# Patient Record
Sex: FEMALE | Race: WHITE
[De-identification: names, ages, dates, MRNs, and addresses within clinical notes are randomized per-mention and may not be internally consistent; named-entity substitution may affect disease eponyms.]

---

## 2017-01-18 ENCOUNTER — HOSPITAL ENCOUNTER (OUTPATIENT)
Dept: HOSPITAL 45 - C.PATHSPEC | Age: 52
Discharge: HOME | End: 2017-01-18
Attending: OBSTETRICS & GYNECOLOGY
Payer: COMMERCIAL

## 2017-01-18 DIAGNOSIS — R87.69: ICD-10-CM

## 2017-01-18 DIAGNOSIS — N95.0: Primary | ICD-10-CM

## 2017-02-06 ENCOUNTER — HOSPITAL ENCOUNTER (OUTPATIENT)
Dept: HOSPITAL 45 - C.LAB1850 | Age: 52
Discharge: HOME | End: 2017-02-06
Attending: INTERNAL MEDICINE
Payer: COMMERCIAL

## 2017-02-06 ENCOUNTER — HOSPITAL ENCOUNTER (OUTPATIENT)
Dept: HOSPITAL 45 - C.RAD1850 | Age: 52
Discharge: HOME | End: 2017-02-06
Attending: INTERNAL MEDICINE
Payer: COMMERCIAL

## 2017-02-06 DIAGNOSIS — K59.00: Primary | ICD-10-CM

## 2017-02-06 LAB
ALBUMIN/GLOB SERPL: 0.8 {RATIO} (ref 0.9–2)
ALP SERPL-CCNC: 166 U/L (ref 45–117)
ALT SERPL-CCNC: 40 U/L (ref 12–78)
ANION GAP SERPL CALC-SCNC: 11 MMOL/L (ref 3–11)
AST SERPL-CCNC: 22 U/L (ref 15–37)
BASOPHILS # BLD: 0.02 K/UL (ref 0–0.2)
BASOPHILS NFR BLD: 0.1 %
BUN SERPL-MCNC: 28 MG/DL (ref 7–18)
BUN/CREAT SERPL: 30.7 (ref 10–20)
CALCIUM SERPL-MCNC: 9.6 MG/DL (ref 8.5–10.1)
CHLORIDE SERPL-SCNC: 104 MMOL/L (ref 98–107)
CO2 SERPL-SCNC: 25 MMOL/L (ref 21–32)
COMPLETE: YES
CREAT SERPL-MCNC: 0.9 MG/DL (ref 0.6–1.2)
EOSINOPHIL NFR BLD AUTO: 314 K/UL (ref 130–400)
GLOBULIN SER-MCNC: 4.4 GM/DL (ref 2.5–4)
GLUCOSE SERPL-MCNC: 103 MG/DL (ref 70–99)
HCT VFR BLD CALC: 39.3 % (ref 37–47)
IG%: 0.6 %
IMM GRANULOCYTES NFR BLD AUTO: 11.8 %
LYMPHOCYTES # BLD: 3.17 K/UL (ref 1.2–3.4)
MCH RBC QN AUTO: 34.3 PG (ref 25–34)
MCHC RBC AUTO-ENTMCNC: 35.4 G/DL (ref 32–36)
MCV RBC AUTO: 97 FL (ref 80–100)
MONOCYTES NFR BLD: 6.2 %
NEUTROPHILS # BLD AUTO: 0 %
NEUTROPHILS NFR BLD AUTO: 81.3 %
PMV BLD AUTO: 9.6 FL (ref 7.4–10.4)
POTASSIUM SERPL-SCNC: 3.5 MMOL/L (ref 3.5–5.1)
RBC # BLD AUTO: 4.05 M/UL (ref 4.2–5.4)
ROULEAUX BLD QL SMEAR: (no result)
SODIUM SERPL-SCNC: 140 MMOL/L (ref 136–145)
TSH SERPL-ACNC: 0.88 UIU/ML (ref 0.3–4.5)
WBC # BLD AUTO: 26.96 K/UL (ref 4.8–10.8)

## 2017-02-06 NOTE — DIAGNOSTIC IMAGING REPORT
ABDOMEN 2VIEW W/PA CHEST RTN



CLINICAL HISTORY: K59.00 Constipation    



COMPARISON STUDY:  No previous studies for comparison. 



FINDINGS: The erect chest reveals no free air. There is a right scapular

deformity which appears chronic. There are no abnormally dilated loops of large

or small bowel. There are no transition zones indicate bowel obstruction. There

is mild fecal retention. There is equivocal faint right renal calculus.



IMPRESSION:  

1. No evidence of bowel obstruction. No evidence of free air

2. Fecal retention

3. Equivocal 3 mm right renal calculus 







Electronically signed by:  Jonnie Brown M.D.

2/6/2017 4:18 PM



Dictated Date/Time:  2/6/2017 4:17 PM

## 2017-02-23 LAB
ANION GAP SERPL CALC-SCNC: 10 MMOL/L (ref 3–11)
BASOPHILS # BLD: 0.02 K/UL (ref 0–0.2)
BASOPHILS NFR BLD: 0.2 %
BUN SERPL-MCNC: 26 MG/DL (ref 7–18)
BUN/CREAT SERPL: 29.5 (ref 10–20)
CALCIUM SERPL-MCNC: 9 MG/DL (ref 8.5–10.1)
CHLORIDE SERPL-SCNC: 104 MMOL/L (ref 98–107)
CO2 SERPL-SCNC: 24 MMOL/L (ref 21–32)
COMPLETE: YES
CREAT CL PREDICTED SERPL C-G-VRATE: 57.8 ML/MIN
CREAT SERPL-MCNC: 0.87 MG/DL (ref 0.6–1.2)
EOSINOPHIL NFR BLD AUTO: 270 K/UL (ref 130–400)
GLUCOSE SERPL-MCNC: 78 MG/DL (ref 70–99)
HCT VFR BLD CALC: 36.2 % (ref 37–47)
IG%: 0.6 %
IMM GRANULOCYTES NFR BLD AUTO: 47 %
LYMPHOCYTES # BLD: 4.11 K/UL (ref 1.2–3.4)
MCH RBC QN AUTO: 33 PG (ref 25–34)
MCHC RBC AUTO-ENTMCNC: 34.8 G/DL (ref 32–36)
MCV RBC AUTO: 94.8 FL (ref 80–100)
MONOCYTES NFR BLD: 8.9 %
NEUTROPHILS # BLD AUTO: 1.6 %
NEUTROPHILS NFR BLD AUTO: 41.7 %
PMV BLD AUTO: 9.1 FL (ref 7.4–10.4)
POTASSIUM SERPL-SCNC: 3.9 MMOL/L (ref 3.5–5.1)
RBC # BLD AUTO: 3.82 M/UL (ref 4.2–5.4)
SODIUM SERPL-SCNC: 138 MMOL/L (ref 136–145)
TOXIC GRANULES BLD QL SMEAR: (no result)
WBC # BLD AUTO: 8.75 K/UL (ref 4.8–10.8)

## 2017-02-23 NOTE — PAT MEDICATION INSTRUCTIONS
Service Date


Feb 23, 2017.





Current Home Medication List


Acetaminophen (Tylenol), 2 TAB PO TID PRN for Pain


Albuterol Sulfate (Proair Respiclick), 2 PUFF INH TID


Azelastine Hcl (Astelin Nasal [place]), 1-2 SPRAYS KASI BID


Budesonide/Formoterol Fumarate (Symbicort 160/4.5 Inhaler ), 2 PUFFS INH BID


Buspirone Hcl (Buspirone Hcl), 30 MG PO BID


Cyclobenzaprine Hcl (Flexeril), 1 TAB PO TID PRN for Pain


Epinephrine (Epipen), 0.3 MG IM UD


Escitalopram Oxalate (Lexapro), 20 MG PO QAM


Fluticasone Propionate (Nasal) (Flonase Allergy Relief), 2 SPRAYS KASI BID


Gabapentin (Neurontin), 900 MG PO TID


Multiple Vitamin (Multivitamin), 1 TAB PO QAM


Omega-3 Fatty Acids (Fish Oil), 1 CAP PO MWF


Omeprazole (Prilosec), 40 MG PO QAM


Sulindac (Sulindac), BID


Sumatriptan Succinate (Imitrex), 1 TAB PO UD PRN for Migraine


Tiotropium Bromide (Spiriva Handihaler), 1 CAP INH QAM


Trazodone Hcl (Trazodone), 50 MG PO HS PRN for Sleep


Valacyclovir Hcl (Valtrex), 1 GM PO BID PRN for COLD SORES





Medication Instructions


For Your Scheduled Surgery 





- Continue as directed:


Epinephrine (Epipen), 0.3 MG IM UD











- Hold the following medications 2 weeks prior to surgery:


Omega-3 Fatty Acids (Fish Oil), 1 CAP PO MWF











- Hold the following medications the morning of surgery:


Multiple Vitamin (Multivitamin), 1 TAB PO QAM


Sulindac (Sulindac), BID


Cyclobenzaprine Hcl (Flexeril), 1 TAB PO TID PRN for Pain











- Take the following medications the morning of surgery with a sip of water:


Valacyclovir Hcl (Valtrex), 1 GM PO BID PRN for COLD SORES


Acetaminophen (Tylenol), 2 TAB PO TID PRN for Pain (may take if needed up to 4 

hours prior to surgery)


Albuterol Sulfate (Proair Respiclick), 2 PUFF INH TID (use if needed; BRING TO 

HOSPITAL)


Azelastine Hcl (Astelin Nasal [place]), 1-2 SPRAYS KASI BID


Budesonide/Formoterol Fumarate (Symbicort 160/4.5 Inhaler ), 2 PUFFS INH BID


Escitalopram Oxalate (Lexapro), 20 MG PO QAM


Omeprazole (Prilosec), 40 MG PO QAM


Fluticasone Propionate (Nasal) (Flonase Allergy Relief), 2 SPRAYS KASI BID


Gabapentin (Neurontin), 900 MG PO TID


Buspirone Hcl (Buspirone Hcl), 30 MG PO BID


Sumatriptan Succinate (Imitrex), 1 TAB PO UD PRN for Migraine


Tiotropium Bromide (Spiriva Handihaler), 1 CAP INH QAM











- Take the following medications as scheduled the night before surgery:


Valacyclovir Hcl (Valtrex), 1 GM PO BID PRN for COLD SORES


Acetaminophen (Tylenol), 2 TAB PO TID PRN for Pain


Albuterol Sulfate (Proair Respiclick), 2 PUFF INH TID


Azelastine Hcl (Astelin Nasal [place]), 1-2 SPRAYS KASI BID


Budesonide/Formoterol Fumarate (Symbicort 160/4.5 Inhaler ), 2 PUFFS INH BID


Fluticasone Propionate (Nasal) (Flonase Allergy Relief), 2 SPRAYS KASI BID


Gabapentin (Neurontin), 900 MG PO TID


Buspirone Hcl (Buspirone Hcl), 30 MG PO BID


Sumatriptan Succinate (Imitrex), 1 TAB PO UD PRN for Migraine


Sulindac (Sulindac), BID


Trazodone Hcl (Trazodone), 50 MG PO HS PRN for Sleep


Cyclobenzaprine Hcl (Flexeril), 1 TAB PO TID PRN for Pain











If you have any questions please call us at [phone] or [phone] or 

[phone]

## 2017-02-23 NOTE — DIAGNOSTIC IMAGING REPORT
CHEST 2 VIEWS ROUTINE



CLINICAL HISTORY: pat preoperative evaluation



COMPARISON STUDY:  2/6/2017



FINDINGS: The bones soft tissues and hemidiaphragms are normal. The

cardiomediastinal silhouette is normal. The lungs are clear. The pulmonary

vasculature is normal. 



IMPRESSION:  Negative chest. 









Electronically signed by:  Lino Sung M.D.

2/23/2017 3:57 PM



Dictated Date/Time:  2/23/2017 3:56 PM

## 2017-03-13 ENCOUNTER — HOSPITAL ENCOUNTER (OUTPATIENT)
Dept: HOSPITAL 45 - C.ACU | Age: 52
Setting detail: OBSERVATION
Discharge: HOME | End: 2017-03-13
Attending: OBSTETRICS & GYNECOLOGY | Admitting: OBSTETRICS & GYNECOLOGY
Payer: COMMERCIAL

## 2017-03-13 VITALS
TEMPERATURE: 97.88 F | SYSTOLIC BLOOD PRESSURE: 120 MMHG | DIASTOLIC BLOOD PRESSURE: 77 MMHG | HEART RATE: 93 BPM | OXYGEN SATURATION: 93 %

## 2017-03-13 VITALS
DIASTOLIC BLOOD PRESSURE: 77 MMHG | TEMPERATURE: 97.7 F | SYSTOLIC BLOOD PRESSURE: 127 MMHG | OXYGEN SATURATION: 98 % | HEART RATE: 85 BPM

## 2017-03-13 VITALS
DIASTOLIC BLOOD PRESSURE: 75 MMHG | TEMPERATURE: 97.7 F | OXYGEN SATURATION: 98 % | SYSTOLIC BLOOD PRESSURE: 120 MMHG | HEART RATE: 90 BPM

## 2017-03-13 VITALS
OXYGEN SATURATION: 96 % | HEART RATE: 83 BPM | TEMPERATURE: 97.88 F | SYSTOLIC BLOOD PRESSURE: 132 MMHG | DIASTOLIC BLOOD PRESSURE: 77 MMHG

## 2017-03-13 VITALS
TEMPERATURE: 97.7 F | HEART RATE: 85 BPM | SYSTOLIC BLOOD PRESSURE: 127 MMHG | DIASTOLIC BLOOD PRESSURE: 77 MMHG | OXYGEN SATURATION: 98 %

## 2017-03-13 VITALS
BODY MASS INDEX: 22.31 KG/M2 | BODY MASS INDEX: 22.31 KG/M2 | BODY MASS INDEX: 22.31 KG/M2 | WEIGHT: 118.17 LBS | HEIGHT: 61 IN | HEIGHT: 61 IN | WEIGHT: 118.17 LBS

## 2017-03-13 VITALS — OXYGEN SATURATION: 93 %

## 2017-03-13 DIAGNOSIS — G89.4: ICD-10-CM

## 2017-03-13 DIAGNOSIS — J44.9: ICD-10-CM

## 2017-03-13 DIAGNOSIS — K21.9: ICD-10-CM

## 2017-03-13 DIAGNOSIS — N87.9: ICD-10-CM

## 2017-03-13 DIAGNOSIS — N95.0: Primary | ICD-10-CM

## 2017-03-13 DIAGNOSIS — R10.2: ICD-10-CM

## 2017-03-13 DIAGNOSIS — F17.200: ICD-10-CM

## 2017-03-13 DIAGNOSIS — K66.0: ICD-10-CM

## 2017-03-13 LAB
HCT VFR BLD CALC: 37.3 % (ref 37–47)
PREG INTERNAL NEGATIVE QC: (no result)
PREG INTERNAL POSITIVE QC: (no result)

## 2017-03-13 PROCEDURE — 58571 TLH W/T/O 250 G OR LESS: CPT

## 2017-03-13 NOTE — [facility] POST OPERATIVE BRIEF NOTE
Immediate Operative Summary


Operative Date


Mar 13, 2017.





Pre-Operative Diagnosis





Pelvic Pain, Postmenopausal Bleeding, Cervical Dysplasia





Post-Operative Diagnosis





Same as preop





Procedure(s) Performed





TLH+BSO, lysis of adhesions, cystoscopy, robotic assisted





Surgeon


Dr. Anderson





Assistant Surgeon(s)


Dr. Nicole





Estimated Blood Loss


20ml





Findings


anterior abdominal wall adhesions





Specimens





A. Cervix, Uterus, Bilateral Fallopian Tubes and Ovaries





Drains


Hensley





Anesthesia


General





Complication(s)


None





Disposition


Recovery Room / PACU

## 2017-03-13 NOTE — OPERATIVE REPORT
DATE OF OPERATION:  03/13/2017

 

PREOPERATIVE DIAGNOSES:  Pelvic pain, postmenopausal bleeding, cervical

dysplasia.

 

POSTOPERATIVE DIAGNOSES:  Same.

 

PROCEDURE:  TLH-BSO, lysis of adhesions, cystoscopy robotically assisted.

 

SURGEON:  Tre Anderson MD

 

ASSISTANT:  Tolu Nicole MD

 

ESTIMATED BLOOD LOSS:  20 mL.

 

FINDINGS:  Extensive intra-abdominal wall adhesions in the peritoneal cavity.

 

SPECIMENS:  Cervix, uterus, bilateral fallopian tubes and ovaries.

 

DRAINS:  Hensley catheter.

 

ANESTHETIC:  General.

 

COMPLICATIONS:  None.

 

DISPOSITION:  Recovery room.

 

CLINICAL NOTE:  [place], we reviewed before the surgery she did state she had

what sounds like a hysterotomy done at 16-20 weeks prior to surgery, I

included this in a bridge note.  We did discuss this increased chance of

internal organ damage as there could be extensive adhesions.

 

DESCRIPTION OF PROCEDURE:  The patient was given general anesthetic, prepped

and draped in dorsal lithotomy position.  IV Ancef given preop.  Hensley

catheter placed in the bladder and uterine VCare placed into her cervix and

uterus and sewn in place.

 

Gloves changed and a supraumbilical incision made with scalpel using open

Julissa technique, we dissected down through subcutaneous fat to the fascia,

splitting the rectus muscles and entering the peritoneal cavity. 

Blunt-tipped Julissa trocar then placed.  Balloon inflated and then CO2 gas to

insufflate the abdomen.

 

FINDINGS:  Upper abdomen normal, no sign of visceral organ injury.  After

deep Trendelenburg position, I viewed the pelvis.

 

At this stage, I could not see the uterus and anterior abdominal wall

adhesions of omentum were present, pictures taken for documentation.  These

included to the right pelvic sidewall as well in the left pelvic sidewall. 

Placed 3 robotic arms, 2 on the right and 1 in the left and in the left upper

quadrant 11 mm bladeless port was placed as well.  Docked the robot.  Arm #1

was monopolar cordell, arm #2 bipolar [place], arm #3 ProGrasp.

 

Carefully dissecting the adhesions away, these were mainly thin, I was able

to dissect away the omental adhesions from the anterior abdominal wall, these

have continued to the anterior aspect of the uterus were dissected away using

the monopolar cordell and the bipolar [place].  Same process on the left side

and the right side as well, taking care to avoid any damage to the

surrounding structures.  Once all the adhesions were away, I was then able to

identify her anatomy.  She had normal ureter locations in both left and right

side.  I made a window into the broad ligament and then dissected the ovarian

artery and vein and then coagulated this with a bipolar [place] taking care

to note that the ureter was well away from this location.  This was then cut.

 Monopolar cordell used to skeletonize and then round ligament was coagulated

and cut as well.

 

Anterior bladder flap had adhesions consistent with the prior hysterotomy. 

We resected these very carefully and sharply, identified the uterine artery

and vein, coagulated this after dissecting the bladder away fully and cut

with monopolar cordell.  Again, the ureter was well away.  Same process

continued on the right.  Once the bladder flap was fully dissected away and

bilaterally the uterine vessels were sealed and cut, I was able to make an

anterior colpotomy and continued this around to separate the cervix and the

vagina.  We stayed medial to our uterine vessel ligations.  The uterus was

then pulled into the vagina and then removed along with the adnexa.  At this

stage, we then placed a sponge in a glove to maintain pneumoperitoneum. 

Instrument exchanges then occurred.  Arm #1 became the timo needle ,

arm #2 became the cobra, 12-inch 2-0 90-day V-Loc suture was then passed

through the accessory port.  IV methylene blue was given by anesthesia, 10

mL.

 

Cup was closed from left to right, back right to left, incorporating at least

1 cm full thickness vaginal mucosa bites.  Suture was cut so there was no

tail.  Needle was removed through the accessory port.

 

I felt the right uterosacral ligament was not as well approximated to the

cuff, so I did actually pass a 2-0 Vicryl on an SH needle 6-inch long and

then a simple interrupted including the uterosacral into the cuff was done

for better pelvic support.  This was cut.  Needle was then removed through

the accessory port.  After this generous irrigation and suction, there was no

leakage of methylene blue dye and hemostasis was excellent.

 

Robot instruments removed, robot undocked, ports removed, gas allowed to

escape.  All incisions have been injected with 0.5% Marcaine before the case

and irrigated with sterile saline.  Fascia closed with 0 Vicryl in the

umbilical incision and left upper quadrant.  Subcutaneous fat closed with 0

Vicryl and incisions closed with 4-0 subcuticular Monocryl and Dermabond. 

Sponge and instrument counts correct.

 

Cystoscopy was performed by removing the Hensley catheter.  Note the sponge had

been removed from the vagina as well.  Cystoscope revealed a normal bladder,

and no sign of trauma or damage or sutures and good strong jets of blue dye

from both left and right ureter openings.  Cystoscope removed and a new Hensley

catheter placed.  There was minimal vaginal bleeding.  At the end of the

procedure, sponge and instrument counts correct.

 

 

I attest to the content of the Intraoperative Record and any orders documented therein. Any exceptio
ns are noted below.

## 2017-03-13 NOTE — ANESTHESIOLOGY PROGRESS NOTE
Anesthesia Post Op Note


Date & Time


Mar 13, 2017 at 17:00





Vital Signs


Pain Intensity:  3





 Vital Signs Past 12 Hours








  Date Time  Temp Pulse Resp B/P Pulse Ox O2 Delivery O2 Flow Rate FiO2


 


3/13/17 16:50  94 18 129/72 96 Nasal Cannula 2 


 


3/13/17 16:40  97 18 139/75 96 Nasal Cannula 2 


 


3/13/17 16:30  98 17 132/72 95 Nasal Cannula 2 


 


3/13/17 16:24 36.0 110 18 133/73 92 Nasal Cannula 2 


 


3/13/17 10:43 36.6 83 18 132/77 96 Room Air  











Notes


Mental Status:  alert / awake / arousable, participated in evaluation


Pt Amnestic to Procedure:  Yes


Nausea / Vomiting:  adequately controlled


Pain:  adequately controlled


Airway Patency, RR, SpO2:  stable & adequate


BP & HR:  stable & adequate


Hydration State:  stable & adequate


Anesthetic Complications:  no major complications apparent

## 2017-03-13 NOTE — HISTORY & PHYSICAL BRIDGE NOTE
H&P Re-Evaluation


Bridge Note:


I have examined the patient, reviewed the History & Physical and in the 

interval since the performance of the History & Physical I have noted the 

following changes of clinical significance: No changes noted





This is a second bridge note. Patient states that she had a laparotomy 

surrounding one of her prior pregnancies. A 16-20 week demise. She was unable 

to provide any solid details. I discussed that prior laparotomy might increase 

risk of adhesions and injury to internal organs.





AV

## 2017-03-13 NOTE — DISCHARGE INSTRUCTIONS
Discharge Instructions


Date of Service


Mar 13, 2017.





Admission


Reason for Admission:  Pelvic Pain, PMB





Discharge


Discharge Diagnosis / Problem:  menorrhagia, pelvic pain





Discharge Goals


Goal(s):  Routine recovery after surgery





Activity Recommendations


Activity Limitations:  per Instructions/Follow-up section





.





Instructions / Follow-Up


Instructions / Follow-Up





POST OPERATIVE:





BOWEL FUNCTION/MEDICATIONS:





1.  Constipation pain and discomfort are the most common complaints 5-7 days 

after 


     surgery.  Points 2-6 address the things that can help.


2.  Chewing gum can help stimulate the gut and help improve digestion and 

motility.


3.  Milk of Magnesia 1-2 times per day until return of bowel function.


4.  Colace is a stool softener that helps.  Taking this 2-3 times per day until 

bowel 


     function returns to normal is highly recommended.


5.  Dulcolax is a laxative that may be used if several days have passed without 

a 


     bowel movement.  Alternatively Miralax may be used daily instead.


6.  Drink plenty of fluids as this will also reduce constipation.


7.  Narcotic pain medications will be prescribed by your physician.  They are 

safe 


     to use and we encourage you to use them.  If you are not allergic, 

ibuprofen will


     also be prescribed.  Many patients will be able to transition off of the 

narcotic 


     medications to ibuprofen by postoperative day 3.





ACTIVITY RECOMMENDATIONS:





1.  Get plenty of rest and listen to your body.  If you are tired, take a nap.


2.  You may shower, but do not take a tub bath until you see your doctor at the 


     2 week post operative visit.


3.  Absolutely NO intercourse and nothing in the vagina until you are examined 


     by your doctor at the 6 week visit.  At that visit it will be determined 

when such


     activities can be resumed.  This can range from 6-12 weeks after your 

surgery 


     depending on healing time.


4.  The main physical activity in the first week should be walking.  By the 

second


     week you can slowly increase activity.  There are no limits on walking up 

and


     down stairs.


5.  Do not lift more than 5-10 lbs for 4 weeks.  Remember the "one-handed rule",


     i.e. if you can lift something with only one hand it's likely okay.


6.  Minimize household chores like vacuuming and exercising for 4 weeks.  


     "Overdoing it" can lead to incisions not healing, pain and vaginal bleeding

, 


     so again, listen to your body.


7.  Driving can be resumed when you feel able.  Do not drive within 24 hours of 

taking


     a narcotic medication.





EXPECTATIONS:





1. Vaginal spotting, bleeding and discharge are common after surgery.  There may


    even be an odor to the discharge which is often related to sutures used in 

the vagina.


    If you experience heavy vaginal bleeding, call the office number day or 


    night [phone].





2.  Bladder discomfort is common after surgery from the catheter.  This usually 

resolves


     in 1-2 weeks.


3.  By the end of the 3rd or 4th week you should be feeling much better.  It 

may take 


     up to 6 weeks for your energy levels to return to normal.


4.  Narcotic medications have side effects such as: dizziness, headache, nausea 

and/or


     vomiting.  If you suspect your pain medication is causing problems, call 

our office 


     and we may be able to prescribe an alternate medication.


5.  The skin incisions are often covered with a liquid bandage.  This will 

gradually peel off 


     over time.





CALL THE OFFICE IF YOU HAVE ANY OF THE FOLLOWIN.  Temperature of 101 degrees or higher.


2.  Severe abdominal or pelvic pain not relieved by pain medication.


3.  Persistent nausea or vomiting.


4.  Increased pain with urination or difficulty urinating.


5.  Bright red bleeding that soaks more than 1 pad per hour.








CONTACT PHONE NUMBERS:





Main Office:  [phone]


Surgical Nurse:  [phone] extension [phone]








Avoid all tobacco products.  If you need help to stop smoking, call


[place]'s FREE QUITLINE at [phone].  This is a free call.














Current Hospital Diet


Patient's current hospital diet:





Discharge Diet


Recommended Diet:  Regular Diet





Procedures


Procedures Performed:  


TLH+BSO, lysis of adhesions, cystoscopy, robotic assisted





Pending Studies


Studies pending at discharge:  no





Medical Emergencies








.


Who to Call and When:





Medical Emergencies:  If at any time you feel your situation is an emergency, 

please call 911 immediately.





.





Non-Emergent Contact


Non-Emergency issues call your:  Primary Care Provider





.


.





"Provider Documentation" section prepared by Tre Anderson.





VTE Core Measure


Inpt VTE Proph given/why not?:  T.E.D. Stockings, LC's

## 2017-03-21 NOTE — DISCHARGE SUMMARY
HOSPITAL COURSE:  [place] had surgery, total laparoscopic hysterectomy, on

03/13/2016.  She was discharged several hours after her surgery.

 

SUBJECTIVE:  At that time, she was ambulating, tolerating an oral diet, oral

pain medication, had minimal bleeding and no extremity pain.

 

PHYSICAL EXAMINATION:  Her vital signs are stable.  She is afebrile.

 

IMPRESSION AND PLAN:  Several hours post total laparoscopic hysterectomy

meets discharge criteria, was discharged home on appropriate pain medication

and told to follow up in the office and given discharge instructions.

## 2018-07-18 ENCOUNTER — HOSPITAL ENCOUNTER (OUTPATIENT)
Dept: HOSPITAL 45 - C.LABBFT | Age: 53
Discharge: HOME | End: 2018-07-18
Attending: NURSE PRACTITIONER
Payer: COMMERCIAL

## 2018-07-18 DIAGNOSIS — Z13.228: ICD-10-CM

## 2018-07-18 DIAGNOSIS — R63.4: Primary | ICD-10-CM

## 2018-07-18 DIAGNOSIS — Z13.6: ICD-10-CM

## 2018-07-18 LAB
ALBUMIN SERPL-MCNC: 3.6 GM/DL (ref 3.4–5)
ALP SERPL-CCNC: 171 U/L (ref 45–117)
ALT SERPL-CCNC: 39 U/L (ref 12–78)
AST SERPL-CCNC: 36 U/L (ref 15–37)
BASOPHILS # BLD: 0.04 K/UL (ref 0–0.2)
BASOPHILS NFR BLD: 0.4 %
BUN SERPL-MCNC: 26 MG/DL (ref 7–18)
CALCIUM SERPL-MCNC: 9.5 MG/DL (ref 8.5–10.1)
CO2 SERPL-SCNC: 22 MMOL/L (ref 21–32)
CREAT SERPL-MCNC: 0.82 MG/DL (ref 0.6–1.2)
EOS ABS #: 0.12 K/UL (ref 0–0.5)
EOSINOPHIL NFR BLD AUTO: 311 K/UL (ref 130–400)
GLUCOSE SERPL-MCNC: 81 MG/DL (ref 70–99)
HCT VFR BLD CALC: 42.1 % (ref 37–47)
HGB BLD-MCNC: 14.5 G/DL (ref 12–16)
IG#: 0.03 K/UL (ref 0–0.02)
IMM GRANULOCYTES NFR BLD AUTO: 37.8 %
KETONES UR QL STRIP: 162 MG/DL
LYMPHOCYTES # BLD: 3.78 K/UL (ref 1.2–3.4)
MCH RBC QN AUTO: 33.3 PG (ref 25–34)
MCHC RBC AUTO-ENTMCNC: 34.4 G/DL (ref 32–36)
MCV RBC AUTO: 96.8 FL (ref 80–100)
MONO ABS #: 0.55 K/UL (ref 0.11–0.59)
MONOCYTES NFR BLD: 5.5 %
NEUT ABS #: 5.47 K/UL (ref 1.4–6.5)
NEUTROPHILS # BLD AUTO: 1.2 %
NEUTROPHILS NFR BLD AUTO: 54.8 %
PH UR: 234 MG/DL (ref 0–200)
PMV BLD AUTO: 10.4 FL (ref 7.4–10.4)
POTASSIUM SERPL-SCNC: 3.9 MMOL/L (ref 3.5–5.1)
PROT SERPL-MCNC: 8 GM/DL (ref 6.4–8.2)
RED CELL DISTRIBUTION WIDTH CV: 13 % (ref 11.5–14.5)
RED CELL DISTRIBUTION WIDTH SD: 46.2 FL (ref 36.4–46.3)
SODIUM SERPL-SCNC: 140 MMOL/L (ref 136–145)
WBC # BLD AUTO: 9.99 K/UL (ref 4.8–10.8)

## 2018-08-07 ENCOUNTER — HOSPITAL ENCOUNTER (OUTPATIENT)
Dept: HOSPITAL 45 - C.RAD1850 | Age: 53
Discharge: HOME | End: 2018-08-07
Attending: INTERNAL MEDICINE
Payer: COMMERCIAL

## 2018-08-07 DIAGNOSIS — M54.2: Primary | ICD-10-CM

## 2018-08-07 DIAGNOSIS — E55.9: ICD-10-CM

## 2018-08-07 DIAGNOSIS — M46.90: ICD-10-CM

## 2018-08-07 DIAGNOSIS — N28.89: ICD-10-CM

## 2018-08-07 DIAGNOSIS — Z15.89: ICD-10-CM

## 2018-08-07 LAB — TRANSFERRIN SERPL-MCNC: 340 MG/DL (ref 200–360)

## 2018-08-07 NOTE — DIAGNOSTIC IMAGING REPORT
L-SPINE MIN 4 VIEWS ROUTINE



HISTORY: Pain  M46.90 [id number] [id number] HLA B27 positi



COMPARISON: 11/14/2012



FINDINGS: There is no fracture. No subluxation. Minimal degenerative disc

change. Minimal degenerative change posterior elements at L4 and L5. Instill

note is made of a 3 mm calcification lower pole right kidney.



IMPRESSION:  



1. Minimal degenerative change.





2. Mild scoliosis.





3. 3 mm lower pole right renal calcification.











The above report was generated using voice recognition software.  It may contain

grammatical, syntax or spelling errors.







Electronically signed by:  Lino Sung M.D.

8/7/2018 9:56 AM



Dictated Date/Time:  8/7/2018 9:54 AM

## 2018-08-07 NOTE — DIAGNOSTIC IMAGING REPORT
R HAND MIN 3 VIEWS ROUTINE



CLINICAL HISTORY: Spondyloarthropathy. HLA B27 positive.    



COMPARISON: Right hand radiographs May 1, 2013.



FINDINGS:  Alignment of the right hand is anatomic. Severe joint space narrowing

with osteophytosis of the right first carpometacarpal joint is noted. No

erosions are identified. Note is made of mild joint space narrowing with

moderate osteophytosis within several distal interphalangeal joints of the right

hand. Osteoarthritis has progressed since exam of May 1, 2013. 



IMPRESSION: 



1. Progression of osteoarthritis within multiple articulations of the right hand

and wrist, most pronounced at the right first carpometacarpal joint.



2. No erosions identified.







Electronically signed by:  Naren Lyon M.D.

8/7/2018 9:56 AM



Dictated Date/Time:  8/7/2018 9:54 AM

## 2018-08-07 NOTE — DIAGNOSTIC IMAGING REPORT
L HAND MIN 3 VIEWS ROUTINE



CLINICAL HISTORY: M46.90 [id number] [id number] HLA B27

positi pain



COMPARISON: None.



DISCUSSION: Significant degenerative change first carpometacarpal joint.

Remaining osseous structures are unremarkable. There is no evidence for

periarticular erosions nor is there evidence for periarticular osteopenia. There

is no evidence for soft tissue swelling.



IMPRESSION: Rather significant degenerative change first carpometacarpal joint.

Otherwise negative study.











The above report was generated using voice recognition software.  It may contain

grammatical, syntax or spelling errors.







Electronically signed by:  Lino Sung M.D.

8/7/2018 9:58 AM



Dictated Date/Time:  8/7/2018 9:56 AM